# Patient Record
Sex: MALE | Race: WHITE | Employment: STUDENT | URBAN - METROPOLITAN AREA
[De-identification: names, ages, dates, MRNs, and addresses within clinical notes are randomized per-mention and may not be internally consistent; named-entity substitution may affect disease eponyms.]

---

## 2018-08-03 ENCOUNTER — OFFICE VISIT (OUTPATIENT)
Dept: INTERNAL MEDICINE | Facility: CLINIC | Age: 23
End: 2018-08-03
Payer: COMMERCIAL

## 2018-08-03 VITALS
HEART RATE: 54 BPM | BODY MASS INDEX: 29.35 KG/M2 | TEMPERATURE: 98.2 F | SYSTOLIC BLOOD PRESSURE: 128 MMHG | WEIGHT: 187 LBS | RESPIRATION RATE: 10 BRPM | OXYGEN SATURATION: 97 % | DIASTOLIC BLOOD PRESSURE: 82 MMHG | HEIGHT: 67 IN

## 2018-08-03 DIAGNOSIS — Z02.5 ROUTINE SPORTS PHYSICAL EXAM: Primary | ICD-10-CM

## 2018-08-03 PROCEDURE — 99385 PREV VISIT NEW AGE 18-39: CPT | Performed by: PHYSICIAN ASSISTANT

## 2018-08-03 NOTE — PROGRESS NOTES
SUBJECTIVE:   CC: Isidro Mayen is an 23 year old male who presents for preventative health visit.     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 Pt does not have the form with him today, but notes his mother is faxing it. Pt notes he exercises without symptoms. He denies any history of concussions.     Today's PHQ-2 Score:   PHQ-2 ( 1999 Pfizer) 8/3/2018   Q1: Little interest or pleasure in doing things 0   Q2: Feeling down, depressed or hopeless 0   PHQ-2 Score 0   Q1: Little interest or pleasure in doing things Not at all   Q2: Feeling down, depressed or hopeless Not at all   PHQ-2 Score 0       Abuse: Current or Past(Physical, Sexual or Emotional)- No  Do you feel safe in your environment - Yes    Social History   Substance Use Topics     Smoking status: Never Smoker     Smokeless tobacco: Never Used     Alcohol use Yes      Comment: 10/time on weekend      Alcohol Use 8/3/2018   If you drink alcohol do you typically have greater than 3 drinks per day OR greater than 7 drinks per week? No       Last PSA: No results found for: PSA    Reviewed orders with patient. Reviewed health maintenance and updated orders accordingly - Yes    Reviewed and updated as needed this visit by clinical staff  Tobacco  Allergies  Meds  Med Hx  Surg Hx  Fam Hx  Soc Hx        Reviewed and updated as needed this visit by Provider  Tobacco  Meds  Surg Hx  Fam Hx  Soc Hx       Review of Systems  CONSTITUTIONAL: NEGATIVE for fever, chills, change in weight  INTEGUMENTARY/SKIN: NEGATIVE for  "worrisome rashes, moles or lesions  EYES: NEGATIVE for vision changes or irritation  ENT: NEGATIVE for ear, mouth and throat problems  RESP: NEGATIVE for significant cough or SOB  CV: NEGATIVE for chest pain, palpitations or peripheral edema  GI: NEGATIVE for nausea, abdominal pain, heartburn, or change in bowel habits   male: negative for dysuria, hematuria, decreased urinary stream, erectile dysfunction, urethral discharge  MUSCULOSKELETAL: NEGATIVE for significant arthralgias or myalgia  NEURO: NEGATIVE for weakness, dizziness or paresthesias  PSYCHIATRIC: NEGATIVE for changes in mood or affect    OBJECTIVE:   /82 (BP Location: Right arm, Patient Position: Chair, Cuff Size: Adult Regular)  Pulse 54  Temp 98.2  F (36.8  C) (Oral)  Resp 10  Ht 5' 7.25\" (1.708 m)  Wt 187 lb (84.8 kg)  SpO2 97%  BMI 29.07 kg/m2    Physical Exam  GENERAL: healthy, alert and no distress  EYES: Eyes grossly normal to inspection, PERRL and conjunctivae and sclerae normal  HENT: ear canals and TM's normal, nose and mouth without ulcers or lesions  NECK: no adenopathy, no asymmetry, masses, or scars and thyroid normal to palpation  RESP: lungs clear to auscultation - no rales, rhonchi or wheezes  BREAST: normal without masses, tenderness or nipple discharge and no palpable axillary masses or adenopathy  CV: regular rate and rhythm, normal S1 S2, no S3 or S4, no murmur, click or rub, no peripheral edema and peripheral pulses strong  ABDOMEN: soft, nontender, no hepatosplenomegaly, no masses and bowel sounds normal  MS: no gross musculoskeletal defects noted, no edema  SKIN: no suspicious lesions or rashes  NEURO: Normal strength and tone, mentation intact and speech normal  PSYCH: mentation appears normal, affect normal/bright    Diagnostic Test Results:  none     ASSESSMENT/PLAN:     1. Routine sports physical exam  - normal exam without additional concerns   - will fill out form when it is faxed   - pt declined any health " maintenance updates today     Pricila Bucio PA-C  [facility]

## 2018-08-03 NOTE — MR AVS SNAPSHOT
After Visit Summary   8/3/2018    Isidro Mayen    MRN: [id number]           Patient Information     Date Of Birth          1995        Visit Information        Provider Department      8/3/2018 2:40 PM Pricila Bucio PA-C [facility]        Today's Diagnoses     Routine sports physical exam    -  1    Screening for HIV (human immunodeficiency virus)        Need for HPV vaccine          Care Instructions      Preventive Health Recommendations  Male Ages 21 - 25     Yearly exam:             See your health care provider every year in order to  o   Review health changes.   o   Discuss preventive care.    o   Review your medicines if your doctor has prescribed any.    You should be tested each year for STDs (sexually transmitted diseases).     Talk to your provider about cholesterol testing.      If you are at risk for diabetes, you should have a diabetes test (fasting glucose).    Shots: Get a flu shot each year. Get a tetanus shot every 10 years.     Nutrition:    Eat at least 5 servings of fruits and vegetables daily.     Eat whole-grain bread, whole-wheat pasta and brown rice instead of white grains and rice.     Get adequate calcium and Vitamin D.     Lifestyle    Exercise for at least 150 minutes a week (30 minutes a day, 5 days a week). This will help you control your weight and prevent disease.     Limit alcohol to one drink per day.     No smoking.     Wear sunscreen to prevent skin cancer.     See your dentist every six months for an exam and cleaning.             Follow-ups after your visit        Who to contact     If you have questions or need follow up information about today's clinic visit or your schedule please contact [facility] directly at [phone].  Normal or non-critical lab and imaging results will be communicated to you by MyChart, letter or phone within 4 business days after the clinic has received the results. If you do  "not hear from us within 7 days, please contact the clinic through [employer] or phone. If you have a critical or abnormal lab result, we will notify you by phone as soon as possible.  Submit refill requests through [employer] or call your pharmacy and they will forward the refill request to us. Please allow 3 business days for your refill to be completed.          Additional Information About Your Visit        Care EveryWhere ID     This is your Care EveryWhere ID. This could be used by other organizations to access your [facility] medical records  [id number]        Your Vitals Were     Pulse Temperature Respirations Height Pulse Oximetry BMI (Body Mass Index)    54 98.2  F (36.8  C) (Oral) 10 5' 7.25\" (1.708 m) 97% 29.07 kg/m2       Blood Pressure from Last 3 Encounters:   08/03/18 128/82   09/10/14 121/68   01/03/14 110/62    Weight from Last 3 Encounters:   08/03/18 187 lb (84.8 kg)   09/10/14 169 lb 4.8 oz (76.8 kg) (81 %)*   01/03/14 157 lb 8 oz (71.4 kg) (75 %)*     * Growth percentiles are based on CDC 2-20 Years data.              Today, you had the following     No orders found for display       Primary Care Provider Office Phone # Fax #    Lokesh Nunez [phone]       [address] [facility] [address]        Equal Access to Services     [facility] THERESA : Hadii aad ku hadasho Soericali, waaxda luqadaha, qaybta kaalmada adeegyada, robert duncan. So [facility] [phone].    ATENCIÓN: Si habla español, tiene a cohen disposición servicios gratuitos de asistencia lingüística. Llame al [phone].    We comply with applicable federal civil rights laws and [place] laws. We do not discriminate on the basis of race, color, national origin, age, disability, sex, sexual orientation, or gender identity.            Thank you!     Thank you for choosing [facility]  for your care. Our goal is always to provide you with excellent care. Hearing back from " our patients is one way we can continue to improve our services. Please take a few minutes to complete the written survey that you may receive in the mail after your visit with us. Thank you!             Your Updated Medication List - Protect others around you: Learn how to safely use, store and throw away your medicines at www.disposemymeds.org.      Notice  As of 8/3/2018  3:06 PM    You have not been prescribed any medications.

## 2020-06-25 ENCOUNTER — TELEPHONE (OUTPATIENT)
Dept: INTERNAL MEDICINE | Facility: CLINIC | Age: 25
End: 2020-06-25

## 2020-06-25 NOTE — TELEPHONE ENCOUNTER
Reason for Call:  Other call back    Detailed comments: pt mother called to request a sports physical for pt to play for the [employer]. Pt was originally scheduled with dr millan for 06/25/20 and pt was called 10 minutes before and was already in the lobby before the appt was canceled. Pt's mom is asking for consideration for pt to get the sports physical. Pt will bring his paperwork. Please call pt's mom to let her know if pt can be worked in or been seen sooner for sports physical before his appt on 7/28/20. Pt's mom prefers an appt the week of 7/6-7/10. Please call pt's mom back to let her know. Thanks.    Phone Number Patient can be reached at: Cell number on file:    Telephone Information:   Mobile [phone]       Best Time: anytime    Can we leave a detailed message on this number? YES    Call taken on 6/25/2020 at 2:32 PM by FANNY OSBORN

## 2020-06-25 NOTE — TELEPHONE ENCOUNTER
Spoke with mom and she will call back to schedule appointment once she verifies when patient is available.

## 2020-07-09 ENCOUNTER — OFFICE VISIT (OUTPATIENT)
Dept: INTERNAL MEDICINE | Facility: CLINIC | Age: 25
End: 2020-07-09
Payer: COMMERCIAL

## 2020-07-09 VITALS
RESPIRATION RATE: 18 BRPM | BODY MASS INDEX: 28.61 KG/M2 | OXYGEN SATURATION: 99 % | HEIGHT: 67 IN | WEIGHT: 182.3 LBS | HEART RATE: 66 BPM | TEMPERATURE: 98.7 F | DIASTOLIC BLOOD PRESSURE: 76 MMHG | SYSTOLIC BLOOD PRESSURE: 126 MMHG

## 2020-07-09 DIAGNOSIS — Z00.00 ROUTINE HISTORY AND PHYSICAL EXAMINATION OF ADULT: Primary | ICD-10-CM

## 2020-07-09 PROCEDURE — 93000 ELECTROCARDIOGRAM COMPLETE: CPT | Performed by: PHYSICIAN ASSISTANT

## 2020-07-09 PROCEDURE — 99395 PREV VISIT EST AGE 18-39: CPT | Performed by: PHYSICIAN ASSISTANT

## 2020-07-09 ASSESSMENT — MIFFLIN-ST. JEOR: SCORE: 1776.57

## 2020-07-09 NOTE — PROGRESS NOTES
SUBJECTIVE:   CC: Isidro Mayen is an 25 year old male who presents for preventative health visit.     Healthy Habits:    Getting at least 3 servings of Calcium per day:  Yes    Bi-annual eye exam:  Yes    Dental care twice a year:  Yes    Sleep apnea or symptoms of sleep apnea:  None    Diet:  Regular (no restrictions)    Frequency of exercise:  4-5 days/week    Duration of exercise:  Greater than 60 minutes    Taking medications regularly:  Not Applicable    Barriers to taking medications:  Not applicable    Medication side effects:  Not applicable    PHQ-2 Total Score:    Additional concerns today:  No      Patient has paperwork for sport's physical. He has had no chronic medical conditions or injuries. It is requiring an EKG.     Today's PHQ-2 Score:   PHQ-2 ( 1999 Pfizer) 7/9/2020   Q1: Little interest or pleasure in doing things 0   Q2: Feeling down, depressed or hopeless 0   PHQ-2 Score 0   Q1: Little interest or pleasure in doing things -   Q2: Feeling down, depressed or hopeless -   PHQ-2 Score -       Abuse: Current or Past(Physical, Sexual or Emotional)- No  Do you feel safe in your environment? Yes        Social History     Tobacco Use     Smoking status: Never Smoker     Smokeless tobacco: Never Used   Substance Use Topics     Alcohol use: Yes     Comment: 10/time on weekend      If you drink alcohol do you typically have >3 drinks per day or >7 drinks per week? No    Alcohol Use 7/9/2020   Prescreen: >3 drinks/day or >7 drinks/week? -   Prescreen: >3 drinks/day or >7 drinks/week? No   No flowsheet data found.    Last PSA: No results found for: PSA    Reviewed orders with patient. Reviewed health maintenance and updated orders accordingly - Yes      Reviewed and updated as needed this visit by clinical staff  Tobacco  Allergies  Meds  Problems  Surg Hx  Soc Hx        Reviewed and updated as needed this visit by Provider  Tobacco  Meds  Problems  Surg Hx  Soc Hx           Review of Systems   Form  "covered extensive ROS which was negative outside h/o concussion with has since resolved.     OBJECTIVE:   /76   Pulse 66   Temp 98.7  F (37.1  C) (Tympanic)   Resp 18   Ht 1.695 m (5' 6.75\")   Wt 82.7 kg (182 lb 4.8 oz)   SpO2 99%   BMI 28.77 kg/m      Physical Exam  GENERAL: healthy, alert and no distress  EYES: Eyes grossly normal to inspection, PERRL and conjunctivae and sclerae normal  HENT: ear canals and TM's normal, nose and mouth without ulcers or lesions  NECK: no adenopathy, no asymmetry, masses, or scars and thyroid normal to palpation  RESP: lungs clear to auscultation - no rales, rhonchi or wheezes  CV: regular rate and rhythm, normal S1 S2, no S3 or S4, no murmur, click or rub, no peripheral edema and peripheral pulses strong  ABDOMEN: soft, nontender, no hepatosplenomegaly, no masses and bowel sounds normal   (male): normal male genitalia without lesions or urethral discharge, no hernia  MS: no gross musculoskeletal defects noted, no edema  SKIN: no suspicious lesions or rashes  NEURO: Normal strength and tone, mentation intact and speech normal  PSYCH: mentation appears normal, affect normal/bright    Diagnostic Test Results:  Labs reviewed in Epic    ASSESSMENT/PLAN:   1. Routine history and physical examination of adult  - normal exam with normal EKG (sinus bradycardia)   - EKG 12-lead complete w/read - Clinics  - form completed without concerns     Pricila Hathaway PA-C  [facility]  "

## 2020-09-30 ENCOUNTER — VIRTUAL VISIT (OUTPATIENT)
Dept: INTERNAL MEDICINE | Facility: CLINIC | Age: 25
End: 2020-09-30
Payer: COMMERCIAL

## 2020-09-30 ENCOUNTER — TELEPHONE (OUTPATIENT)
Dept: PEDIATRICS | Facility: CLINIC | Age: 25
End: 2020-09-30

## 2020-09-30 DIAGNOSIS — J01.01 ACUTE RECURRENT MAXILLARY SINUSITIS: Primary | ICD-10-CM

## 2020-09-30 PROCEDURE — 99213 OFFICE O/P EST LOW 20 MIN: CPT | Mod: 95 | Performed by: PHYSICIAN ASSISTANT

## 2020-09-30 NOTE — PROGRESS NOTES
"Isidro Mayen is a 25 year old male who is being evaluated via a billable video visit.      The patient has been notified of following:     \"This video visit will be conducted via a call between you and your physician/provider. We have found that certain health care needs can be provided without the need for an in-person physical exam.  This service lets us provide the care you need with a video conversation.  If a prescription is necessary we can send it directly to your pharmacy.  If lab work is needed we can place an order for that and you can then stop by our lab to have the test done at a later time.    Video visits are billed at different rates depending on your insurance coverage.  Please reach out to your insurance provider with any questions.    If during the course of the call the physician/provider feels a video visit is not appropriate, you will not be charged for this service.\"    Patient has given verbal consent for Video visit? Yes  How would you like to obtain your AVS? Mail a copy  If you are dropped from the video visit, the video invite should be resent to: Text to cell phone: [phone]  Will anyone else be joining your video visit? No      Subjective     Isidro Mayen is a 25 year old male who presents today via video visit for the following health issues:    HPI    Concern - sinus pressure and ear pain   Onset: 1-2 weeks  Description: Sinus pressure around eye and nose and ear pressure   Headaches around eyes, forehead and nose   Denies fever, sore throat, cough, loss of taste or smell and diarrhea   Intensity: mild, moderate  Progression of Symptoms:  improving  Accompanying Signs & Symptoms: none  Previous history of similar problem: none  Precipitating factors:        Worsened by: when going to bed  Alleviating factors:        Improved by: nothing  Therapies tried and outcome: nettipot which has been helping a little bit      Video Start Time: 1:00 PM          Objective           Vitals:  No " vitals were obtained today due to virtual visit.    Physical Exam     GENERAL: Healthy, alert and no distress  EYES: Eyes grossly normal to inspection.  No discharge or erythema, or obvious scleral/conjunctival abnormalities.  RESP: No audible wheeze, cough, or visible cyanosis.  No visible retractions or increased work of breathing.    SKIN: Visible skin clear. No significant rash, abnormal pigmentation or lesions.      No results found for this or any previous visit (from the past 24 hour(s)).        Assessment & Plan     Acute recurrent maxillary sinusitis  - treatment as below, follow up if symptoms worsen/change or fail to improve  - symptoms are localized to sinuses and patient reports no COVID-19 symptoms thus did not order testing at this time, reviewed this with patient and made clear if symptoms change we need to reconsider this   - amoxicillin-clavulanate (AUGMENTIN) 875-125 MG tablet; Take 1 tablet by mouth 2 times daily for 7 days       No follow-ups on file.    Pricila Hathaway PA-C  [facility]      Video-Visit Details    Type of service:  Video Visit    Video End Time:1:05 PM    Originating Location (pt. Location): Home    Distant Location (provider location):  [facility]     Platform used for Video Visit: Mónica         yes

## 2020-09-30 NOTE — TELEPHONE ENCOUNTER
"Reached patient's mother, on CTC, patient not available. Patient has been experiencing pressure in head and ears and above forehead. A lot of mucous coming out of nose. Has been using neti pot for 3 days. A lot of discharge coming out and feels better, but ears hurt. No fever. Nasal discharge not a \"distinct color\" . No known exposure to COVID. No recent international travel. No cough, SOB, rash, fever. Does have seasonal allergies, a lot of times is congested and sneezing. Has never had a sinus infection. Never has really needed antibiotics.    Do you prefer in person or virtual visit? No need to call mom back if keeping in person.  "

## 2020-09-30 NOTE — TELEPHONE ENCOUNTER
Recommend video virtual visit to start and if needed to be seen afterwards can make apt, but I believe we can address this over video.

## 2020-09-30 NOTE — TELEPHONE ENCOUNTER
Please triage patient to ensure he is not coming in today for covid symptoms per Pricila SERRANO.

## 2020-10-14 ENCOUNTER — NURSE TRIAGE (OUTPATIENT)
Dept: PEDIATRICS | Facility: CLINIC | Age: 25
End: 2020-10-14

## 2020-10-14 NOTE — TELEPHONE ENCOUNTER
Reason for Call:  Other call back    Detailed comments: Patient mother has called indicating that patient did start feeling better for a while but symptoms still remain.  Would like to know what other course of action can be taken as patient has used all his prescription.  Patient states symptoms appear to start in ear and moves to eyes and forehead.    Phone Number Patient can be reached at: Home number on file [phone] (home)    Best Time: any time    Can we leave a detailed message on this number? YES    Call taken on 10/14/2020 at 1:51 PM by Sandy Hagen

## 2020-10-15 ENCOUNTER — OFFICE VISIT (OUTPATIENT)
Dept: INTERNAL MEDICINE | Facility: CLINIC | Age: 25
End: 2020-10-15
Payer: COMMERCIAL

## 2020-10-15 VITALS
HEART RATE: 75 BPM | SYSTOLIC BLOOD PRESSURE: 116 MMHG | TEMPERATURE: 98.4 F | HEIGHT: 67 IN | DIASTOLIC BLOOD PRESSURE: 70 MMHG | BODY MASS INDEX: 28.72 KG/M2 | WEIGHT: 183 LBS | OXYGEN SATURATION: 97 %

## 2020-10-15 DIAGNOSIS — J34.89 SINUS PAIN: Primary | ICD-10-CM

## 2020-10-15 DIAGNOSIS — J30.2 SEASONAL ALLERGIC RHINITIS, UNSPECIFIED TRIGGER: ICD-10-CM

## 2020-10-15 PROCEDURE — 99213 OFFICE O/P EST LOW 20 MIN: CPT | Performed by: PHYSICIAN ASSISTANT

## 2020-10-15 RX ORDER — FLUTICASONE PROPIONATE 50 MCG
1 SPRAY, SUSPENSION (ML) NASAL DAILY
Qty: 16 G | Refills: 3 | Status: SHIPPED | OUTPATIENT
Start: 2020-10-15

## 2020-10-15 RX ORDER — CETIRIZINE HYDROCHLORIDE 10 MG/1
10 TABLET ORAL DAILY
Qty: 90 TABLET | Refills: 0 | Status: SHIPPED | OUTPATIENT
Start: 2020-10-15

## 2020-10-15 ASSESSMENT — MIFFLIN-ST. JEOR: SCORE: 1779.74

## 2020-10-15 NOTE — TELEPHONE ENCOUNTER
"Patient did get better after finishing antibiotic but symptoms came back.      Additional Information    Negative: Severe difficulty breathing (e.g., struggling for each breath, speaks in single words)    Negative: Sounds like a life-threatening emergency to the triager    Negative: Difficulty breathing and not from stuffy nose (e.g., not relieved by cleaning out the nose)    Negative: SEVERE headache and fever    Negative: Taking antibiotic > 24 hours and fever > 103 F (39.4 C)    Negative: Redness or swelling on the cheek, forehead or around the eye and fever    Negative: Patient sounds very sick or weak to the triager    Negative: SEVERE sinus pain and not improved 2 hours after pain medicine    Negative: Redness or swelling on the cheek, forehead or around the eye and new since starting antibiotics    Taking antibiotic > 72 hours (3 days) and sinus pain not improved    Negative: Patient wants to be seen    Answer Assessment - Initial Assessment Questions  1. ANTIBIOTIC: \"What antibiotic are you receiving?\" \"How many times per day?\"      Per chart review.   2. ONSET: \"When was the antibiotic started?\"      Over the weekend finished abx  3. PAIN: \"How bad is the sinus pain?\"   (Scale 1-10; mild, moderate or severe)    - MILD (1-3): doesn't interfere with normal activities     - MODERATE (4-7): interferes with normal activities (e.g., work or school) or awakens from sleep    - SEVERE (8-10): excruciating pain and patient unable to do any normal activities         Moderate, night time makes ear pressure.  4. FEVER: \"Do you have a fever?\" If so, ask: \"What is it, how was it measured, and when did it start?\"       No  5. SYMPTOMS: \"Are there any other symptoms you're concerned about?\" If so, ask: \"When did it start?\"      Ear pressure, No cough. No nasal drainage. Has ongoing seasonal allergies. On occassions will take OTC allergy med. No jaw or teeth pain.  6. PREGNANCY: \"Is there any chance you are pregnant?\" \"When " "was your last menstrual period?\"      n/a    Protocols used: SINUS INFECTION ON ANTIBIOTIC FOLLOW-UP CALL-A-OH      "

## 2020-10-15 NOTE — PROGRESS NOTES
"Subjective     Isidro Mayen is a 25 year old male who presents to clinic today for the following health issues:    HPI          Patient is following up his sinus infection. He notes the antibiotics were helping. After he stopped them his symptoms returned and worsened.   - her reports sinus pain and pressure  - pain comes and goes   - headaches  - ear pain   - denies fever and cough  - does note seasonal allergy symptoms and that he is sneezing most of the day      Mild tenderness on exam           Objective    /70   Pulse 75   Temp 98.4  F (36.9  C) (Oral)   Ht 1.695 m (5' 6.75\")   Wt 83 kg (183 lb)   SpO2 97%   BMI 28.88 kg/m    Body mass index is 28.88 kg/m .  Physical Exam   GENERAL: healthy, alert and no distress  EYES: Eyes grossly normal to inspection, PERRL and conjunctivae and sclerae normal  HENT: normal cephalic/atraumatic, ear canals and TM's normal, nose and mouth without ulcers or lesions, oropharynx clear and oral mucous membranes moist, initially noted sinus non tender on exam, notes maybe some pressure mild tenderness after reviewing   NECK: no adenopathy, no asymmetry, masses, or scars and thyroid normal to palpation  RESP: lungs clear to auscultation - no rales, rhonchi or wheezes  CV: regular rates and rhythm, normal S1 S2, no S3 or S4 and no murmur, click or rub    No results found for this or any previous visit (from the past 24 hour(s)).        Assessment & Plan     Sinus pain  - unclear if sinus symptoms are allergic vs bacterial   - will try allergy regimen, if symptoms worsen or fail to improve, would recommend longer course of Augmentin     Seasonal allergic rhinitis, unspecified trigger  - fluticasone (FLONASE) 50 MCG/ACT nasal spray; Spray 1 spray into both nostrils daily  - cetirizine (ZYRTEC) 10 MG tablet; Take 1 tablet (10 mg) by mouth daily       No follow-ups on file.    Pricila Hathaway PA-C  [facility]    "